# Patient Record
Sex: MALE | Race: WHITE | NOT HISPANIC OR LATINO | Employment: FULL TIME | URBAN - METROPOLITAN AREA
[De-identification: names, ages, dates, MRNs, and addresses within clinical notes are randomized per-mention and may not be internally consistent; named-entity substitution may affect disease eponyms.]

---

## 2018-03-13 ENCOUNTER — OFFICE VISIT CONVERTED (OUTPATIENT)
Dept: FAMILY MEDICINE CLINIC | Facility: CLINIC | Age: 23
End: 2018-03-13
Attending: NURSE PRACTITIONER

## 2018-04-25 ENCOUNTER — OFFICE VISIT CONVERTED (OUTPATIENT)
Dept: FAMILY MEDICINE CLINIC | Facility: CLINIC | Age: 23
End: 2018-04-25
Attending: NURSE PRACTITIONER

## 2018-11-05 ENCOUNTER — OFFICE VISIT CONVERTED (OUTPATIENT)
Dept: FAMILY MEDICINE CLINIC | Facility: CLINIC | Age: 23
End: 2018-11-05
Attending: NURSE PRACTITIONER

## 2019-03-26 ENCOUNTER — OFFICE VISIT CONVERTED (OUTPATIENT)
Dept: FAMILY MEDICINE CLINIC | Facility: CLINIC | Age: 24
End: 2019-03-26
Attending: NURSE PRACTITIONER

## 2019-04-30 ENCOUNTER — CONVERSION ENCOUNTER (OUTPATIENT)
Dept: FAMILY MEDICINE CLINIC | Facility: CLINIC | Age: 24
End: 2019-04-30

## 2019-04-30 ENCOUNTER — OFFICE VISIT CONVERTED (OUTPATIENT)
Dept: FAMILY MEDICINE CLINIC | Facility: CLINIC | Age: 24
End: 2019-04-30
Attending: NURSE PRACTITIONER

## 2019-07-22 ENCOUNTER — OFFICE VISIT CONVERTED (OUTPATIENT)
Dept: FAMILY MEDICINE CLINIC | Facility: CLINIC | Age: 24
End: 2019-07-22
Attending: NURSE PRACTITIONER

## 2021-05-15 VITALS
SYSTOLIC BLOOD PRESSURE: 122 MMHG | RESPIRATION RATE: 12 BRPM | HEART RATE: 77 BPM | HEIGHT: 69 IN | WEIGHT: 184 LBS | TEMPERATURE: 97.9 F | DIASTOLIC BLOOD PRESSURE: 62 MMHG | OXYGEN SATURATION: 97 % | BODY MASS INDEX: 27.25 KG/M2

## 2021-05-15 VITALS
HEIGHT: 69 IN | BODY MASS INDEX: 28.07 KG/M2 | DIASTOLIC BLOOD PRESSURE: 65 MMHG | TEMPERATURE: 98.8 F | OXYGEN SATURATION: 94 % | RESPIRATION RATE: 12 BRPM | WEIGHT: 189.5 LBS | SYSTOLIC BLOOD PRESSURE: 132 MMHG | HEART RATE: 86 BPM

## 2021-05-15 VITALS
OXYGEN SATURATION: 98 % | TEMPERATURE: 99 F | RESPIRATION RATE: 12 BRPM | WEIGHT: 186.31 LBS | HEART RATE: 71 BPM | SYSTOLIC BLOOD PRESSURE: 133 MMHG | DIASTOLIC BLOOD PRESSURE: 69 MMHG

## 2021-05-16 VITALS
HEART RATE: 87 BPM | DIASTOLIC BLOOD PRESSURE: 65 MMHG | OXYGEN SATURATION: 98 % | HEIGHT: 69 IN | TEMPERATURE: 98 F | BODY MASS INDEX: 26.14 KG/M2 | WEIGHT: 176.5 LBS | SYSTOLIC BLOOD PRESSURE: 115 MMHG | RESPIRATION RATE: 12 BRPM

## 2021-05-16 VITALS
OXYGEN SATURATION: 99 % | BODY MASS INDEX: 27.02 KG/M2 | WEIGHT: 182.44 LBS | RESPIRATION RATE: 12 BRPM | HEIGHT: 69 IN | SYSTOLIC BLOOD PRESSURE: 133 MMHG | DIASTOLIC BLOOD PRESSURE: 73 MMHG | TEMPERATURE: 98.1 F | HEART RATE: 92 BPM

## 2021-05-16 VITALS
TEMPERATURE: 98 F | BODY MASS INDEX: 26 KG/M2 | HEIGHT: 69 IN | RESPIRATION RATE: 16 BRPM | DIASTOLIC BLOOD PRESSURE: 64 MMHG | OXYGEN SATURATION: 100 % | SYSTOLIC BLOOD PRESSURE: 118 MMHG | WEIGHT: 175.5 LBS | HEART RATE: 71 BPM

## 2023-01-05 ENCOUNTER — OFFICE VISIT (OUTPATIENT)
Dept: FAMILY MEDICINE CLINIC | Facility: CLINIC | Age: 28
End: 2023-01-05
Payer: MEDICAID

## 2023-01-05 VITALS
OXYGEN SATURATION: 97 % | TEMPERATURE: 97.1 F | HEIGHT: 69 IN | BODY MASS INDEX: 29.86 KG/M2 | HEART RATE: 80 BPM | SYSTOLIC BLOOD PRESSURE: 116 MMHG | DIASTOLIC BLOOD PRESSURE: 78 MMHG | WEIGHT: 201.6 LBS

## 2023-01-05 DIAGNOSIS — L65.9 PATCHY LOSS OF HAIR: Primary | ICD-10-CM

## 2023-01-05 DIAGNOSIS — H66.92 LEFT ACUTE OTITIS MEDIA: ICD-10-CM

## 2023-01-05 PROBLEM — J02.9 SORE THROAT: Status: ACTIVE | Noted: 2023-01-05

## 2023-01-05 PROBLEM — F41.9 ANXIETY: Status: ACTIVE | Noted: 2023-01-05

## 2023-01-05 PROBLEM — J34.89 OTHER DISEASES OF NASAL CAVITY AND SINUSES: Status: ACTIVE | Noted: 2023-01-05

## 2023-01-05 PROBLEM — J30.9 ALLERGIC RHINITIS: Status: ACTIVE | Noted: 2023-01-05

## 2023-01-05 PROCEDURE — 99203 OFFICE O/P NEW LOW 30 MIN: CPT | Performed by: NURSE PRACTITIONER

## 2023-01-05 RX ORDER — AMOXICILLIN 875 MG/1
875 TABLET, COATED ORAL 2 TIMES DAILY
Qty: 20 TABLET | Refills: 0 | Status: SHIPPED | OUTPATIENT
Start: 2023-01-05 | End: 2023-01-15

## 2023-01-05 NOTE — PROGRESS NOTES
Chief Complaint  Nasal Congestion (With clear mucus, Has improved since apt was made ) and Alopecia (Loosing hair in patches all over body, Started four months )    Subjective          Mac Das, 27 y.o. male presents to [facility]  History of Present Illness   He presents today for an acute visit and to establish care.  He is a previous patient of [facility].  He is complaining of a possible sinus infection, nasal discharge and he is complaining of hair loss.  He states that he is been having sinus congestion and drainage for the past 7 days but states his sinus drainage and congestion is getting better.  He does complain of left ear pain.  He declines to be tested for COVID or flu.     He is now complaining of some hair loss.  He states he noticed the first patch of hair loss on his scalp in August 2022 and then he started having hair loss patches in his pubic area and his beard. He states that he had COVID in July 2022.  He states after he had COVID, his baby mama left and he has been under stress.  He also lost a grandparent recently.    PHQ-9 Total Score: 0     Tobacco Use: High Risk   • Smoking Tobacco Use: Never   • Smokeless Tobacco Use: Current   • Passive Exposure: Not on file       Objective   Vital Signs:   /78 (BP Location: Left arm, Patient Position: Sitting, Cuff Size: Adult)   Pulse 80   Temp 97.1 °F (36.2 °C)   Ht 175.3 cm (69\")   Wt 91.4 kg (201 lb 9.6 oz)   SpO2 97%   BMI 29.77 kg/m²       Current Outpatient Medications:   •  amoxicillin (AMOXIL) 875 MG tablet, Take 1 tablet by mouth 2 (Two) Times a Day for 10 days., Disp: 20 tablet, Rfl: 0   History reviewed. No pertinent past medical history.   Physical Exam  Vitals reviewed.   Constitutional:       Appearance: Normal appearance. He is well-developed.   HENT:      Right Ear: Tympanic membrane, ear canal and external ear normal.      Left Ear: Ear canal and external ear  normal. Tympanic membrane is injected.      Mouth/Throat:      Mouth: Mucous membranes are moist.      Pharynx: No pharyngeal swelling, oropharyngeal exudate or posterior oropharyngeal erythema.      Comments: Postnasal drainage noted.    Neck:      Thyroid: No thyroid mass, thyromegaly or thyroid tenderness.   Cardiovascular:      Rate and Rhythm: Normal rate and regular rhythm.      Heart sounds: No murmur heard.    No friction rub. No gallop.   Pulmonary:      Effort: Pulmonary effort is normal.      Breath sounds: Normal breath sounds. No wheezing or rhonchi.   Lymphadenopathy:      Cervical: No cervical adenopathy.   Skin:     General: Skin is warm and dry.      Comments: Small patch of hair loss noted to right anterior scalp and right side of beard.   Neurological:      Mental Status: He is alert and oriented to person, place, and time.      Cranial Nerves: No cranial nerve deficit.   Psychiatric:         Mood and Affect: Mood and affect normal.         Behavior: Behavior normal.         Thought Content: Thought content normal. Thought content does not include homicidal or suicidal ideation.         Judgment: Judgment normal.        Result Review :                 Assessment and Plan    Diagnoses and all orders for this visit:    1. Patchy loss of hair (Primary)  Assessment & Plan:  I discussed with him that hair loss can be related to COVID and/or stress.  Recommend to have labs checked, especially thyroid levels.  He is currently self-pay because he cannot find his insurance card.  He will check for his insurance card and then schedule a lab appointment.  I will call results of labs when available.  We discussed possible referral to dermatology.    Orders:  -     TSH+Free T4; Future  -     CBC Auto Differential; Future  -     Comprehensive Metabolic Panel; Future    2. Left acute otitis media  Comments:  Amoxicillin x10 days, advised to finish all of medication even when feeling better.  Advised to  call/follow-up if no improvement or worsening of symptoms.  Orders:  -     amoxicillin (AMOXIL) 875 MG tablet; Take 1 tablet by mouth 2 (Two) Times a Day for 10 days.  Dispense: 20 tablet; Refill: 0      Follow Up   Return if symptoms worsen or fail to improve.  Patient was given instructions and counseling regarding his condition or for health maintenance advice. Please see specific information pulled into the AVS if appropriate.       Nusrat Rodriguez, APRN  01/05/2023

## 2023-01-05 NOTE — ASSESSMENT & PLAN NOTE
I discussed with him that hair loss can be related to COVID and/or stress.  Recommend to have labs checked, especially thyroid levels.  He is currently self-pay because he cannot find his insurance card.  He will check for his insurance card and then schedule a lab appointment.  I will call results of labs when available.  We discussed possible referral to dermatology.

## 2023-05-03 ENCOUNTER — TELEPHONE (OUTPATIENT)
Dept: FAMILY MEDICINE CLINIC | Facility: CLINIC | Age: 28
End: 2023-05-03
Payer: MEDICAID

## 2023-05-03 NOTE — TELEPHONE ENCOUNTER
"Patient phoned office states he was wanting to talk with Kiersten, I advise patient Kiersten is seeing patient's today, can I take a message. He states \" no thats ok , I have to go to sleep- Never mind.\"  "

## 2023-05-04 NOTE — TELEPHONE ENCOUNTER
Tried calling patient , no voicemail set up. 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

## 2025-02-26 ENCOUNTER — TELEPHONE (OUTPATIENT)
Dept: FAMILY MEDICINE CLINIC | Facility: CLINIC | Age: 30
End: 2025-02-26

## 2025-02-26 NOTE — TELEPHONE ENCOUNTER
Patient transferred from [facility].  Stated patient would be new patient,  sore throat has not been here since 1/5/2023. Did not have new patient appointments until March. I spoke with patient he is aware he would be I  tried to advice him to go to urgent care patient said he would not be back here hung up.